# Patient Record
Sex: FEMALE | Race: WHITE | URBAN - METROPOLITAN AREA
[De-identification: names, ages, dates, MRNs, and addresses within clinical notes are randomized per-mention and may not be internally consistent; named-entity substitution may affect disease eponyms.]

---

## 2024-05-23 NOTE — TELEPHONE ENCOUNTER
A [employer] message has been sent to the patient to see if she needs this refill today or if it can be sent in at her upcoming visit with Dr. Estevez.

## 2024-05-24 RX ORDER — ETONOGESTREL AND ETHINYL ESTRADIOL VAGINAL RING .015; .12 MG/D; MG/D
RING VAGINAL
OUTPATIENT
Start: 2024-05-24

## 2024-05-28 ENCOUNTER — OFFICE VISIT (OUTPATIENT)
Age: 54
End: 2024-05-28
Payer: COMMERCIAL

## 2024-05-28 VITALS — BODY MASS INDEX: 25.84 KG/M2 | WEIGHT: 175 LBS

## 2024-05-28 DIAGNOSIS — Z01.419 WELL WOMAN EXAM: Primary | ICD-10-CM

## 2024-05-28 PROCEDURE — 99396 PREV VISIT EST AGE 40-64: CPT | Performed by: OBSTETRICS & GYNECOLOGY

## 2024-05-28 RX ORDER — NEBIVOLOL 5 MG/1
TABLET ORAL
COMMUNITY
Start: 2024-03-15

## 2024-05-28 RX ORDER — LISDEXAMFETAMINE DIMESYLATE 40 MG/1
CAPSULE ORAL
COMMUNITY
Start: 2024-03-15

## 2024-05-28 RX ORDER — TIRZEPATIDE 7.5 MG/.5ML
INJECTION, SOLUTION SUBCUTANEOUS
COMMUNITY
Start: 2024-05-07

## 2024-05-28 NOTE — PROGRESS NOTES
Chief Complaint   Patient presents with    Annual Exam     Patient here for annual exam. States she is still using the Nuvaring, no cycles.  Ob/Gyn Hx:  A2  LMP - No LMP recorded.  Menses - none   Contraception - NuvaRing vaginal inserts.  Hx of STI - No    SA - Yes      Health maintenance:  Last Pap: 2023  Last Mammogram: 2022  Last Bone Density:   Last colonoscopy: scheduled 2023    1. Have you been to the ER, urgent care clinic, or hospitalized since your last visit?No    2. Have you seen or consulted any other health care providers outside of the [facility] since your last visit? No    She declines  a chaperone during the gynecologic exam today.      Blanca Botello LPN

## 2024-05-28 NOTE — PROGRESS NOTES
Annual exam    Here for annual with concerns noted in nursing note.  Lengthy discussion regarding HTN and recommendation to stop nuvaring.  Willing to stop ring and check FSH next week.  Reviewed ERT; Risks and benefits fully reviewed and all questions answered.  Working hard on weight loss and now on zebound  Past Medical History:   Diagnosis Date    Abnormal MRI 11/13/13    cancer lesion on right kidney    Acne     ADD (attention deficit disorder)     Dr. Montelongo    ADHD (attention deficit hyperactivity disorder)     Anemia     ASCUS with positive high risk HPV cervical 03/15/16    Asthma     low ferritin 4/08    Cancer (HCC) 2011    Left Renal Ca    Cervical high risk HPV (human papillomavirus) test positive 2015;2016    Neg 16&18    Chronic kidney disease     cancerous leisons    DDD (degenerative disc disease), lumbar     L3-L5, injection 12/07 Dr. Valdes    GERD (gastroesophageal reflux disease)     H/O LEEP 03/28/16    ALCIRA II    Hypertension     Nausea & vomiting     Pap smear for cervical cancer screening 11/18/13    LGSIL,HPV Positive    Syncope      Past Surgical History:   Procedure Laterality Date    APPENDECTOMY  1988    BREAST BIOPSY Left 1994    neg; surgical bx    BREAST SURGERY  01/2016    CHOLECYSTECTOMY  8/2/12    COLPOSCOPY  12/04/13    Low Grade Change    ENDOMETRIAL ABLATION  08/04/2017    Ablation NOT done--perforation    HEENT      wisdom teeth extraction    IMPLANT BREAST SILICONE/EQ Bilateral 2016    KIDNEY REMOVAL  2011    Partial    LEEP  03/28/16    ALCIRA II       Current Outpatient Medications   Medication Sig Dispense Refill    VYVANSE 40 MG CAPS       nebivolol (BYSTOLIC) 5 MG tablet       ZEPBOUND 7.5 MG/0.5ML SOAJ INJECT 7.5 MG EVERY WEEK BY SUBCUTANEOUS ROUTE.      etonogestrel-ethinyl estradiol (NUVARING) 0.12-0.015 MG/24HR vaginal ring INSERT 1 RING VAGINALLY AS DIRECTED. REMOVE AFTER 3 WEEKS & WAIT 7 DAYS BEFORE INSERTING A NEW RING 3 each 0    albuterol sulfate HFA

## 2024-05-28 NOTE — PATIENT INSTRUCTIONS
can also help check your pelvic organs.  You may have a small amount of vaginal discharge or bleeding after the exam.  Why is a pelvic exam done?  A pelvic exam may be done:  To collect samples of cells for cervical cancer screening.  To check for vaginal infection.  To check for sexually transmitted infections, such as chlamydia or herpes.  To help find the cause of abnormal uterine bleeding.  To look for problems like uterine fibroids, ovarian cysts, or uterine prolapse.  To help find the cause of pelvic or belly pain.  Before inserting an intrauterine device (IUD).  To collect evidence if you've been sexually assaulted.  What are the risks of a pelvic exam?  There is a small chance that the doctor will find something on a pelvic exam that would not have caused a problem. This is called overdiagnosis. It could lead to tests or treatment you don't need.  When should you call for help?  Watch closely for changes in your health, and be sure to contact your doctor if you have any problems.  Where can you learn more?  Go to https://www.[employer].net/patientEd and enter M421 to learn more about \"Pelvic Exam: Care Instructions.\"  Current as of: November 27, 2023               Content Version: 14.0  © 4712-0712 [employer].   Care instructions adapted under license by [employer]. If you have questions about a medical condition or this instruction, always ask your healthcare professional. [employer] disclaims any warranty or liability for your use of this information.

## 2024-05-31 LAB
., LABCORP: NORMAL
CYTOLOGIST CVX/VAG CYTO: NORMAL
CYTOLOGY CVX/VAG DOC CYTO: NORMAL
CYTOLOGY CVX/VAG DOC THIN PREP: NORMAL
DX ICD CODE: NORMAL
Lab: NORMAL
OTHER STN SPEC: NORMAL
STAT OF ADQ CVX/VAG CYTO-IMP: NORMAL

## 2024-06-06 ENCOUNTER — NURSE ONLY (OUTPATIENT)
Age: 54
End: 2024-06-06

## 2024-06-06 DIAGNOSIS — N95.1 MENOPAUSAL SYMPTOM: Primary | ICD-10-CM

## 2024-06-06 LAB — FSH SERPL-ACNC: 1.9 MIU/ML

## 2024-06-07 RX ORDER — ETONOGESTREL AND ETHINYL ESTRADIOL VAGINAL RING .015; .12 MG/D; MG/D
RING VAGINAL
Qty: 3 EACH | Refills: 3 | Status: SHIPPED | OUTPATIENT
Start: 2024-06-07 | End: 2024-06-07 | Stop reason: SDUPTHER

## 2024-06-07 RX ORDER — ETONOGESTREL AND ETHINYL ESTRADIOL VAGINAL RING .015; .12 MG/D; MG/D
RING VAGINAL
Qty: 3 EACH | Refills: 3 | Status: SHIPPED | OUTPATIENT
Start: 2024-06-07

## 2024-09-17 ENCOUNTER — HOSPITAL ENCOUNTER (OUTPATIENT)
Age: 54
Discharge: HOME | End: 2024-09-20
Payer: COMMERCIAL

## 2024-09-17 VITALS — BODY MASS INDEX: 22.73 KG/M2 | HEIGHT: 68 IN | WEIGHT: 150 LBS

## 2024-09-17 DIAGNOSIS — Z12.31 ENCOUNTER FOR SCREENING MAMMOGRAM FOR MALIGNANT NEOPLASM OF BREAST: ICD-10-CM

## 2024-09-17 PROCEDURE — 77063 BREAST TOMOSYNTHESIS BI: CPT
